# Patient Record
Sex: FEMALE | Race: BLACK OR AFRICAN AMERICAN | NOT HISPANIC OR LATINO | URBAN - METROPOLITAN AREA
[De-identification: names, ages, dates, MRNs, and addresses within clinical notes are randomized per-mention and may not be internally consistent; named-entity substitution may affect disease eponyms.]

---

## 2024-07-10 ENCOUNTER — TELEPHONE (OUTPATIENT)
Dept: PRIMARY CARE CLINIC | Facility: CLINIC | Age: 24
End: 2024-07-10

## 2024-07-10 NOTE — TELEPHONE ENCOUNTER
Contacted patient to answer questions about appointment.      ----- Message from Vira Raymond sent at 7/10/2024  4:16 PM CDT -----  Contact: Pt [phone]  Type: Returning a call    Who left a message? N/a    When did the practice call? N/a    Does patient know what this is regarding: not sure    Would the patient rather a call back or a response via [facility]? Call Back    Comments:      Please Call and advise    Thank you    Please do NOT rep[ly to sender as this is from the call center and they answer incoming calls only.